# Patient Record
Sex: FEMALE | Race: WHITE | NOT HISPANIC OR LATINO
[De-identification: names, ages, dates, MRNs, and addresses within clinical notes are randomized per-mention and may not be internally consistent; named-entity substitution may affect disease eponyms.]

---

## 2020-03-23 ENCOUNTER — APPOINTMENT (OUTPATIENT)
Dept: MRI IMAGING | Facility: CLINIC | Age: 55
End: 2020-03-23

## 2020-07-21 ENCOUNTER — APPOINTMENT (OUTPATIENT)
Dept: UROGYNECOLOGY | Facility: CLINIC | Age: 55
End: 2020-07-21
Payer: MEDICAID

## 2020-07-21 VITALS
WEIGHT: 145 LBS | DIASTOLIC BLOOD PRESSURE: 80 MMHG | TEMPERATURE: 98.5 F | BODY MASS INDEX: 24.16 KG/M2 | SYSTOLIC BLOOD PRESSURE: 122 MMHG | HEIGHT: 65 IN

## 2020-07-21 DIAGNOSIS — F41.9 ANXIETY DISORDER, UNSPECIFIED: ICD-10-CM

## 2020-07-21 DIAGNOSIS — Z87.898 PERSONAL HISTORY OF OTHER SPECIFIED CONDITIONS: ICD-10-CM

## 2020-07-21 DIAGNOSIS — Z87.828 PERSONAL HISTORY OF OTHER (HEALED) PHYSICAL INJURY AND TRAUMA: ICD-10-CM

## 2020-07-21 DIAGNOSIS — Z63.5 DISRUPTION OF FAMILY BY SEPARATION AND DIVORCE: ICD-10-CM

## 2020-07-21 DIAGNOSIS — Z78.9 OTHER SPECIFIED HEALTH STATUS: ICD-10-CM

## 2020-07-21 DIAGNOSIS — F32.9 MAJOR DEPRESSIVE DISORDER, SINGLE EPISODE, UNSPECIFIED: ICD-10-CM

## 2020-07-21 PROCEDURE — 99204 OFFICE O/P NEW MOD 45 MIN: CPT

## 2020-07-21 RX ORDER — VALACYCLOVIR 500 MG/1
500 TABLET, FILM COATED ORAL
Refills: 0 | Status: ACTIVE | COMMUNITY

## 2020-07-21 RX ORDER — DULOXETINE HYDROCHLORIDE 30 MG/1
CAPSULE, DELAYED RELEASE ORAL
Refills: 0 | Status: ACTIVE | COMMUNITY

## 2020-07-21 RX ORDER — MIRABEGRON 25 MG/1
25 TABLET, FILM COATED, EXTENDED RELEASE ORAL
Refills: 0 | Status: ACTIVE | COMMUNITY

## 2020-07-21 SDOH — SOCIAL STABILITY - SOCIAL INSECURITY: DISRUPTION OF FAMILY BY SEPARATION AND DIVORCE: Z63.5

## 2020-07-21 NOTE — HISTORY OF PRESENT ILLNESS
[Constipation Obstructed Defecation] : no [Urinary Frequency] : no [Urinary Frequency More Than Twice At Night (Nocturia)] : no nocturia [] : years ago [Unable To Restrain Bowel Movement] : no [Urinary Tract Infection] : no [Feelings Of Urinary Urgency] : no [de-identified] : resolved on myrbetriq 25 mg daily [de-identified] : improved since starting Gabapentin at night [de-identified] : resolved on myrbetriq [FreeTextEntry1] : \par Becky presents for consultation for overactive bladder and urgency urinary incontinence which started ~1 year ago. She has been previously evaluated and treated by Dr Andrews. She underwent urodynamic testing in 2019 which was consistent with overactive bladder. She was treated with oxybutynin, which was discontinued due to severe side effects. She also tried Tolterodine which was discontinued due to side effects. She was then started on Myrbetriq 25 mg which she is still on. She reports resolution of her urinary symptoms on the myrbetriq and denies side effects. She is taking Gabapentin, Klonopin, Cymbalta. She reports Gabapentin has improved her nocturia. \par \par PMH: anxiety, depression, mild spondylosis, small disc protrusion T6-T7\par PSH: none\par Social History: , nonsmoker, not employed

## 2020-07-21 NOTE — REASON FOR VISIT
[Questionnaire Received] : Patient questionnaire received [Urinary Incontinence] : urinary incontinence [Intake Form Reviewed] : Patient intake form with past medical history, surgical history, family history and social history reviewed today [Oxybutynin] : Oxybutynin [Other: ___] : [unfilled]

## 2020-07-21 NOTE — PHYSICAL EXAM
[Chaperone Present] : A chaperone was present in the examining room during all aspects of the physical examination [Labia Majora] : were normal [Labia Minora] : were normal [No Bleeding] : there was no active vaginal bleeding [Normal Appearance] : general appearance was normal [Normal] : no abnormalities [Exam Deferred] : was deferred [Post Void Residual ____ml] : post void residual was [unfilled] ml [FreeTextEntry1] : General: Well, appearing. Alert and orientated. No acute distress\par HEENT: Normocephalic, atraumatic and extraocular muscles appear to be intact \par Neck: Full range of motion, no obvious lymphadenopathy, deformities, or masses noted \par Respiratory: Speaking in full sentences comfortably, normal work of breathing and no cough during visit\par Musculoskeletal: active full range of motion in extremities \par Extremities: No upper extremity edema noted\par Skin: no obvious rash or skin lesions\par Neuro: Orientated X 3, speech is fluent, normal rate\par Psych: Normal mood and affect \par   [Tenderness] : ~T no ~M abdominal tenderness observed [Distended] : not distended [H/Smegaly] : no hepatosplenomegaly [de-identified] : no prolapse

## 2020-07-21 NOTE — DISCUSSION/SUMMARY
[FreeTextEntry1] : \linda Pena presents with overactive bladder and urgency incontinence which is significantly improved with Myrbetriq 25 mg daily. She is unable to take anticholinergics secondary to severe side effects. She requires authorization for the myrbetriq which my office will obtain. Rx for Myrbetriq 25 mg provided with refills. She has an appt with new GYN August 1. She will RTO in 1 year for follow up, or sooner if issues arise. All questions answered.

## 2021-02-11 ENCOUNTER — NON-APPOINTMENT (OUTPATIENT)
Age: 56
End: 2021-02-11

## 2021-07-26 ENCOUNTER — APPOINTMENT (OUTPATIENT)
Dept: UROGYNECOLOGY | Facility: CLINIC | Age: 56
End: 2021-07-26
Payer: MEDICAID

## 2021-07-26 VITALS
BODY MASS INDEX: 23.32 KG/M2 | WEIGHT: 140 LBS | DIASTOLIC BLOOD PRESSURE: 80 MMHG | SYSTOLIC BLOOD PRESSURE: 120 MMHG | TEMPERATURE: 96.2 F | HEIGHT: 65 IN

## 2021-07-26 DIAGNOSIS — R82.90 UNSPECIFIED ABNORMAL FINDINGS IN URINE: ICD-10-CM

## 2021-07-26 LAB
BILIRUB UR QL STRIP: NEGATIVE
CLARITY UR: CLEAR
COLLECTION METHOD: NORMAL
GLUCOSE UR-MCNC: NEGATIVE
HCG UR QL: 0.2 EU/DL
HGB UR QL STRIP.AUTO: NEGATIVE
KETONES UR-MCNC: NEGATIVE
LEUKOCYTE ESTERASE UR QL STRIP: NEGATIVE
NITRITE UR QL STRIP: NEGATIVE
PH UR STRIP: 5.5
PROT UR STRIP-MCNC: NORMAL
SP GR UR STRIP: >1.03

## 2021-07-26 PROCEDURE — 99213 OFFICE O/P EST LOW 20 MIN: CPT

## 2021-07-26 PROCEDURE — 81003 URINALYSIS AUTO W/O SCOPE: CPT | Mod: QW

## 2021-07-26 RX ORDER — CLONAZEPAM 0.5 MG/1
0.5 TABLET ORAL
Refills: 0 | Status: COMPLETED | COMMUNITY
End: 2021-07-26

## 2021-07-26 RX ORDER — GABAPENTIN 600 MG/1
600 TABLET, COATED ORAL
Refills: 0 | Status: COMPLETED | COMMUNITY
End: 2021-07-26

## 2021-07-28 LAB
APPEARANCE: ABNORMAL
BACTERIA UR CULT: NORMAL
BACTERIA: NEGATIVE
BILIRUBIN URINE: ABNORMAL
BLOOD URINE: NEGATIVE
COLOR: YELLOW
GLUCOSE QUALITATIVE U: NEGATIVE
HYALINE CASTS: 0 /LPF
KETONES URINE: NORMAL
LEUKOCYTE ESTERASE URINE: NEGATIVE
MICROSCOPIC-UA: NORMAL
NITRITE URINE: NEGATIVE
PH URINE: 6.5
PROTEIN URINE: ABNORMAL
RED BLOOD CELLS URINE: 1 /HPF
SPECIFIC GRAVITY URINE: >=1.03
SQUAMOUS EPITHELIAL CELLS: 1 /HPF
URINE COMMENTS: NORMAL
UROBILINOGEN URINE: NORMAL
WHITE BLOOD CELLS URINE: 1 /HPF

## 2021-08-24 ENCOUNTER — APPOINTMENT (OUTPATIENT)
Dept: UROGYNECOLOGY | Facility: CLINIC | Age: 56
End: 2021-08-24
Payer: MEDICAID

## 2021-08-24 VITALS — SYSTOLIC BLOOD PRESSURE: 140 MMHG | HEART RATE: 70 BPM | DIASTOLIC BLOOD PRESSURE: 80 MMHG | TEMPERATURE: 96.8 F

## 2021-08-24 DIAGNOSIS — R39.15 URGENCY OF URINATION: ICD-10-CM

## 2021-08-24 DIAGNOSIS — R30.0 DYSURIA: ICD-10-CM

## 2021-08-24 LAB
BILIRUB UR QL STRIP: NORMAL
CLARITY UR: NORMAL
COLLECTION METHOD: NORMAL
GLUCOSE UR-MCNC: NORMAL
HCG UR QL: 0.2 EU/DL
HGB UR QL STRIP.AUTO: NORMAL
KETONES UR-MCNC: NORMAL
LEUKOCYTE ESTERASE UR QL STRIP: NORMAL
NITRITE UR QL STRIP: POSITIVE
PH UR STRIP: 5.5
PROT UR STRIP-MCNC: 30
SP GR UR STRIP: 1.03

## 2021-08-24 PROCEDURE — 99213 OFFICE O/P EST LOW 20 MIN: CPT

## 2021-08-24 PROCEDURE — 81003 URINALYSIS AUTO W/O SCOPE: CPT | Mod: QW

## 2021-08-24 RX ORDER — SULFAMETHOXAZOLE AND TRIMETHOPRIM 800; 160 MG/1; MG/1
800-160 TABLET ORAL
Qty: 6 | Refills: 0 | Status: ACTIVE | COMMUNITY
Start: 2021-08-24 | End: 1900-01-01

## 2021-08-24 RX ORDER — PHENAZOPYRIDINE HYDROCHLORIDE 200 MG/1
200 TABLET ORAL EVERY 8 HOURS
Qty: 9 | Refills: 0 | Status: ACTIVE | COMMUNITY
Start: 2021-08-24 | End: 1900-01-01

## 2021-08-27 LAB
APPEARANCE: ABNORMAL
BACTERIA: ABNORMAL
BILIRUBIN URINE: NEGATIVE
BLOOD URINE: ABNORMAL
COLOR: YELLOW
GLUCOSE QUALITATIVE U: NEGATIVE
HYALINE CASTS: 0 /LPF
KETONES URINE: NEGATIVE
LEUKOCYTE ESTERASE URINE: ABNORMAL
MICROSCOPIC-UA: NORMAL
NITRITE URINE: POSITIVE
PH URINE: 6
PROTEIN URINE: ABNORMAL
RED BLOOD CELLS URINE: 18 /HPF
SPECIFIC GRAVITY URINE: 1.03
SQUAMOUS EPITHELIAL CELLS: 0 /HPF
UROBILINOGEN URINE: NORMAL
WHITE BLOOD CELLS URINE: 106 /HPF

## 2021-08-27 RX ORDER — AMOXICILLIN 500 MG/1
500 CAPSULE ORAL
Qty: 21 | Refills: 0 | Status: ACTIVE | COMMUNITY
Start: 2021-05-21

## 2021-08-27 RX ORDER — ESTRADIOL 10 UG/1
10 TABLET VAGINAL
Qty: 8 | Refills: 0 | Status: ACTIVE | COMMUNITY
Start: 2021-08-09

## 2021-08-27 RX ORDER — CARBOXYMETHYLCELLULOSE SODIUM, GLYCERIN, AND POLYSORBATE 80 5; 10; 5 MG/ML; MG/ML; MG/ML
0.5-1-0.5 SOLUTION/ DROPS OPHTHALMIC
Qty: 10 | Refills: 0 | Status: ACTIVE | COMMUNITY
Start: 2021-03-05

## 2021-08-27 RX ORDER — AMOXICILLIN AND CLAVULANATE POTASSIUM 875; 125 MG/1; MG/1
875-125 TABLET, COATED ORAL
Qty: 20 | Refills: 0 | Status: ACTIVE | COMMUNITY
Start: 2021-08-06

## 2021-08-27 RX ORDER — POLYVINYL ALCOHOL, POVIDONE 14; 6 MG/ML; MG/ML
1.4-0.6 SOLUTION/ DROPS OPHTHALMIC
Qty: 30 | Refills: 0 | Status: ACTIVE | COMMUNITY
Start: 2021-03-05

## 2021-08-27 RX ORDER — IBANDRONATE SODIUM 150 MG/1
150 TABLET ORAL
Qty: 1 | Refills: 0 | Status: ACTIVE | COMMUNITY
Start: 2021-08-09

## 2021-08-27 RX ORDER — NITROFURANTOIN (MONOHYDRATE/MACROCRYSTALS) 25; 75 MG/1; MG/1
100 CAPSULE ORAL
Qty: 10 | Refills: 0 | Status: ACTIVE | COMMUNITY
Start: 2021-08-27 | End: 1900-01-01

## 2021-08-27 RX ORDER — LORAZEPAM 1 MG/1
1 TABLET ORAL
Qty: 90 | Refills: 0 | Status: ACTIVE | COMMUNITY
Start: 2021-08-04

## 2021-08-27 RX ORDER — OMEGA-3-ACID ETHYL ESTERS CAPSULES 1 G/1
1 CAPSULE, LIQUID FILLED ORAL
Qty: 120 | Refills: 0 | Status: ACTIVE | COMMUNITY
Start: 2021-02-15

## 2021-08-27 RX ORDER — MECLIZINE HYDROCHLORIDE 25 MG/1
25 TABLET ORAL
Qty: 90 | Refills: 0 | Status: ACTIVE | COMMUNITY
Start: 2021-03-12

## 2021-08-27 RX ORDER — ACETAMINOPHEN AND CODEINE 300; 30 MG/1; MG/1
300-30 TABLET ORAL
Qty: 12 | Refills: 0 | Status: ACTIVE | COMMUNITY
Start: 2021-05-21

## 2021-08-27 RX ORDER — KETOTIFEN FUMARATE 0.25 MG/ML
0.03 SOLUTION/ DROPS OPHTHALMIC
Qty: 5 | Refills: 0 | Status: ACTIVE | COMMUNITY
Start: 2021-03-05

## 2021-08-27 RX ORDER — CYCLOBENZAPRINE HYDROCHLORIDE 10 MG/1
10 TABLET, FILM COATED ORAL
Qty: 30 | Refills: 0 | Status: ACTIVE | COMMUNITY
Start: 2021-07-30

## 2021-08-27 RX ORDER — DULOXETINE HYDROCHLORIDE 60 MG/1
60 CAPSULE, DELAYED RELEASE PELLETS ORAL
Qty: 60 | Refills: 0 | Status: ACTIVE | COMMUNITY
Start: 2021-08-03

## 2021-09-01 NOTE — DISCUSSION/SUMMARY
[FreeTextEntry1] : 1. Dysuria, Frequency, urgency\par - POCT Udip pos nit, small jacques; formal UA/CS sent to lab\par - Due to patient symptoms and Udip will tx empirically with Bactrim DS 1 tab q12h x 3 days. Advised patient to take medication with food and drink plenty of water\par - eRX Pyridium sent to pharmacy advised patient she may take q8h as needed with food\par \par 2. OAB\par - Continue with Myrbetriq as prescribed\par \par 3. Atrophy\par - Continue with vaginal estrogen as prescribed\par \par Will RTO for follow up in 11 months or sooner if needed. Patient agrees to call office with any questions or concerns, verbalized understanding.

## 2021-09-01 NOTE — HISTORY OF PRESENT ILLNESS
[FreeTextEntry1] : Patient with known hx of OAB on myrbetriq presents to office with complaints of dysuria, frequency and urgency since this morning. States she was seen in this office on 7/26/21 and advised to follow up with her PMD due to proteinuria. States when she followed up they had her provide a clean catch urine sample and stated she had a UTI. She was treated with Augmentin x 10 days. Denies fever, chills, back pain or blood in urine. States she also recently completed a course of Monistat for a yeast infection.  States she is taking the myrbetriq and feels it is helping her OAB. Denies any bothersome side effects. She is also using Yuvafem as prescribed without any complaints. Denies pelvic pain, pressure or vaginal bleeding.

## 2021-09-01 NOTE — PHYSICAL EXAM
[No Acute Distress] : in no acute distress [Well developed] : well developed [Well Nourished] : ~L well nourished [Good Hygeine] : demonstrates good hygeine [Oriented x3] : oriented to person, place, and time [Normal Memory] : ~T memory was ~L unimpaired [Normal Mood/Affect] : mood and affect are normal [None] : no CVA tenderness [Warm and Dry] : was warm and dry to touch [Normal Gait] : gait was normal [Normal] : normal external genitalia [Vulvar Atrophy] : vulvar atrophy [Labia Majora] : were normal [Labia Minora] : were normal [Normal Appearance] : general appearance was normal [Dry Mucosa] : dry mucosa [Discharge] : a  ~M vaginal discharge was present [Scant] : scant [Clear] : clear [No Bleeding] : there was no active vaginal bleeding [Anxiety] : patient is not anxious [Tenderness] : ~T no ~M abdominal tenderness observed [Distended] : not distended [FreeTextEntry4] : N

## 2021-09-01 NOTE — PROCEDURE
[Straight Catheterization] : insertion of a straight catheter [Urinary Frequency] : urinary frequency [Other ___] : [unfilled] [Patient] : the patient [___ Fr Straight Tip] : a [unfilled] in [place] straight tip catheter [None] : there were no complications with the catheter insertion [Cloudy] : cloudy [Culture] : culture [Urinalysis] : urinalysis [No Complications] : no complications [Tolerated Well] : the patient tolerated the procedure well [Post procedure instructions and information given] : Post procedure instructions and information were given and reviewed with patient. [0] : 0 [FreeTextEntry1] : Urethra cleared, catheter passed. No CVAT

## 2021-12-01 ENCOUNTER — APPOINTMENT (OUTPATIENT)
Dept: UROGYNECOLOGY | Facility: CLINIC | Age: 56
End: 2021-12-01
Payer: MEDICAID

## 2021-12-01 VITALS — DIASTOLIC BLOOD PRESSURE: 80 MMHG | TEMPERATURE: 96.8 F | SYSTOLIC BLOOD PRESSURE: 140 MMHG

## 2021-12-01 PROCEDURE — 99213 OFFICE O/P EST LOW 20 MIN: CPT

## 2021-12-01 NOTE — DISCUSSION/SUMMARY
[FreeTextEntry1] : \linda Pena presents with overactive bladder and urgency incontinence which is significantly improved with Myrbetriq 25 mg daily. She is unable to take anticholinergics secondary to severe side effects. She is concerned that her copay will increase for Myrbetriq and asked if we can give her an Rx for 50mg which she will cut in half. This will give her a 60 day supply. Rx for Myrbetriq 50 mg provided with refills. If she notices difference in effect of medication with 1/2 50mg tablet, will call to change back to 25mg tablet. She will RTO in 1 year for follow up, or sooner if issues arise. All questions answered.

## 2021-12-01 NOTE — HISTORY OF PRESENT ILLNESS
[Unable To Restrain Bowel Movement] : no [Urinary Frequency] : no [Feelings Of Urinary Urgency] : no [Urinary Frequency More Than Twice At Night (Nocturia)] : no nocturia [Urinary Tract Infection] : no [] : years ago [de-identified] : resolved on myrbetriq 25 mg daily [de-identified] : resolved on myrbetriq [FreeTextEntry1] : \par Becky presents for follow up for overactive bladder and urgency urinary incontinence.  She continues to report significant improvement in her urinary symptoms on the myrbetriq and denies side effects. \par \par  She has been previously evaluated and treated by Dr Andrews. She underwent urodynamic testing in 2019 which was consistent with overactive bladder. She was treated with oxybutynin, which was discontinued due to severe side effects. She also tried Tolterodine which was discontinued due to side effects. She was then started on Myrbetriq 25 mg which she is still on.. \par \par PMH: anxiety, depression, mild spondylosis, small disc protrusion T6-T7\par PSH: none\par Social History: , nonsmoker, not employed

## 2022-02-23 ENCOUNTER — RX CHANGE (OUTPATIENT)
Age: 57
End: 2022-02-23

## 2022-02-25 ENCOUNTER — NON-APPOINTMENT (OUTPATIENT)
Age: 57
End: 2022-02-25

## 2022-05-20 RX ORDER — MIRABEGRON 25 MG/1
25 TABLET, FILM COATED, EXTENDED RELEASE ORAL
Qty: 30 | Refills: 4 | Status: DISCONTINUED | COMMUNITY
Start: 2020-07-21 | End: 2022-05-20

## 2022-12-05 ENCOUNTER — APPOINTMENT (OUTPATIENT)
Dept: UROGYNECOLOGY | Facility: CLINIC | Age: 57
End: 2022-12-05

## 2022-12-05 ENCOUNTER — TRANSCRIPTION ENCOUNTER (OUTPATIENT)
Age: 57
End: 2022-12-05

## 2022-12-05 ENCOUNTER — RESULT CHARGE (OUTPATIENT)
Age: 57
End: 2022-12-05

## 2022-12-05 VITALS — HEART RATE: 66 BPM | TEMPERATURE: 97.1 F | DIASTOLIC BLOOD PRESSURE: 62 MMHG | SYSTOLIC BLOOD PRESSURE: 104 MMHG

## 2022-12-05 DIAGNOSIS — R35.0 FREQUENCY OF MICTURITION: ICD-10-CM

## 2022-12-05 DIAGNOSIS — N89.8 OTHER SPECIFIED NONINFLAMMATORY DISORDERS OF VAGINA: ICD-10-CM

## 2022-12-05 LAB
BILIRUB UR QL STRIP: NORMAL
CLARITY UR: CLEAR
COLLECTION METHOD: NORMAL
GLUCOSE UR-MCNC: NORMAL
HCG UR QL: 0.2 EU/DL
HGB UR QL STRIP.AUTO: NORMAL
KETONES UR-MCNC: NORMAL
LEUKOCYTE ESTERASE UR QL STRIP: NORMAL
NITRITE UR QL STRIP: NORMAL
PH UR STRIP: 7
PROT UR STRIP-MCNC: NORMAL
SP GR UR STRIP: 1.01

## 2022-12-05 PROCEDURE — 81003 URINALYSIS AUTO W/O SCOPE: CPT | Mod: QW

## 2022-12-05 PROCEDURE — 99214 OFFICE O/P EST MOD 30 MIN: CPT

## 2022-12-05 NOTE — DISCUSSION/SUMMARY
[FreeTextEntry1] : OAB:\par -Continue on the Myrbetriq 50mg as she is doing well on medication.\par \par Urinary urgency:\par -Urine dip negative.\par \par Vaginal irritation:\par -Pt was Rx Estrogen cream by her GYN and she will continue on it.\par \par Follow up in 12 months or sooner.  IF pt have any problems/concern to call office.  PT aware and agrees.

## 2022-12-05 NOTE — HISTORY OF PRESENT ILLNESS
[] : years ago [FreeTextEntry1] : Becky, 58y/o presents to the office for follow up on OAB.  She is also c/o urinary urgency and some vaginal irritation.  She is taking Myrbetriq 50mg.  She is also using Estrogen cream Rx by her GYN and noticed some vaginal irritation.  Denies any fever, chills, back pain, or blood in urine.  \par \par \par PMH: anxiety, depression, mild spondylosis, small disc protrusion T6-T7\par PSH: none\par Social History: , nonsmoker, not employed

## 2022-12-05 NOTE — PHYSICAL EXAM
[No Acute Distress] : in no acute distress [Well developed] : well developed [Well Nourished] : ~L well nourished [Good Hygeine] : demonstrates good hygeine [Oriented x3] : oriented to person, place, and time [Normal Memory] : ~T memory was ~L unimpaired [Normal Mood/Affect] : mood and affect are normal [Soft, Nontender] : the abdomen was soft and nontender [Warm and Dry] : was warm and dry to touch [Normal Gait] : gait was normal [Vulvar Atrophy] : vulvar atrophy [Labia Majora] : were normal [Normal] : was normal [Normal Appearance] : general appearance was normal [Discharge] : a  ~M vaginal discharge was present [Scant] : scant [White] : white [No Bleeding] : there was no active vaginal bleeding [de-identified] : No bleeding noted.

## 2023-06-02 ENCOUNTER — APPOINTMENT (OUTPATIENT)
Dept: NEUROLOGY | Facility: CLINIC | Age: 58
End: 2023-06-02

## 2023-08-21 ENCOUNTER — NON-APPOINTMENT (OUTPATIENT)
Age: 58
End: 2023-08-21

## 2023-08-28 ENCOUNTER — RESULT CHARGE (OUTPATIENT)
Age: 58
End: 2023-08-28

## 2023-08-28 ENCOUNTER — APPOINTMENT (OUTPATIENT)
Dept: UROGYNECOLOGY | Facility: CLINIC | Age: 58
End: 2023-08-28
Payer: MEDICAID

## 2023-08-28 DIAGNOSIS — N39.41 URGE INCONTINENCE: ICD-10-CM

## 2023-08-28 DIAGNOSIS — N32.81 OVERACTIVE BLADDER: ICD-10-CM

## 2023-08-28 LAB
BILIRUB UR QL STRIP: NORMAL
CLARITY UR: CLEAR
COLLECTION METHOD: NORMAL
GLUCOSE UR-MCNC: NORMAL
HCG UR QL: 0.2 EU/DL
HGB UR QL STRIP.AUTO: NORMAL
KETONES UR-MCNC: NORMAL
LEUKOCYTE ESTERASE UR QL STRIP: NORMAL
NITRITE UR QL STRIP: NORMAL
PH UR STRIP: 8.5
PROT UR STRIP-MCNC: NORMAL
SP GR UR STRIP: 1.01

## 2023-08-28 PROCEDURE — 99214 OFFICE O/P EST MOD 30 MIN: CPT | Mod: 25

## 2023-08-28 PROCEDURE — 81003 URINALYSIS AUTO W/O SCOPE: CPT | Mod: QW

## 2023-08-28 PROCEDURE — 51701 INSERT BLADDER CATHETER: CPT

## 2023-08-28 RX ORDER — MIRABEGRON 50 MG/1
50 TABLET, FILM COATED, EXTENDED RELEASE ORAL
Qty: 30 | Refills: 5 | Status: DISCONTINUED | COMMUNITY
Start: 2021-12-01 | End: 2023-08-28

## 2023-08-28 NOTE — HISTORY OF PRESENT ILLNESS
[FreeTextEntry1] : Becky, 56y/o presents to the office for follow up as she is anxious and concerned of what bladder issue she has.  She has been on Myrbetriq 25mg PO daily since 2016? by her urologist who told her she neurogenic bladder.  She came to see Dr. Monk and was told she had OAB, otherwise, Myrbetriq 25mg would not have worked.  She had tried Oxybutynin in the past and she had reaction to the medication.  She noticed urgency upon getting up in the morning and had UI.  She have not been using her Estradiol cream as Rx by her GYN.  Stated she is not sexually active. PT was emotional and discussed multiple issues.  She was Dx with Fibromyalgia, CPTSD, anxiety.  She stated she is not a lot of medication.  She is seeing a therapist.  Denies any suicidal ideation or harm to self.  She is more emotional as her mother had moved back with her and was dx with bladder cancer approximately 2 months ago.  Mother finishing up with chemotherapy and radiation.  PT unsure if she has bladder cancer and was concerned.  She had also followed up with her PMD.  Labs and EKG were abnormal but pt did not have the actual results except message from her PMD portal stating this.

## 2023-08-28 NOTE — DISCUSSION/SUMMARY
[FreeTextEntry1] : OAB/UI: -Continue on the Myrbetriq 25mg PO daily.  PT stated it helps. -Discussion on what the medication does and what it's for.  She is aware. -Continue on behavioral modification and diet, fluid intake. -PVR in office 80mL, urine dip negative. -Discussed in depth - bladder cancer, OAB, neurogenic bladder.    Vaginal atrophy: -PT to continue use of the Estradiol cream as Rx by her GYN.  -R/B were reviewed with pt.  PT was advised to follow up with therapist and she is aware she will be following up.   Pt will follow up in 6-12 months or sooner if needed.  If pt have any problems/concern to call office.  PT verbalizes understanding and agrees.

## 2023-08-28 NOTE — PROCEDURE
[Straight Catheterization] : insertion of a straight catheter [Urgent Incontinence] : urgent incontinence [Other ___] : [unfilled] [Patient] : the patient [___ Fr Straight Tip] : a [unfilled] in [place] straight tip catheter [Clear] : clear [Tolerated Well] : the patient tolerated the procedure well [No Complications] : no complications [Post procedure instructions and information given] : Post procedure instructions and information were given and reviewed with patient. [FreeTextEntry1] : Urine obtained via 14FR sterile straight, PVR 80mL, no CVAT. [de-identified] : PVR 80mL

## 2023-08-28 NOTE — PHYSICAL EXAM
[Well developed] : well developed [Well Nourished] : ~L well nourished [Good Hygeine] : demonstrates good hygeine [Oriented x3] : oriented to person, place, and time [Anxiety] : patient is anxious [Soft, Nontender] : the abdomen was soft and nontender [Warm and Dry] : was warm and dry to touch [Normal Gait] : gait was normal [Labia Majora] : were normal [Normal] : was normal

## 2024-01-18 ENCOUNTER — RX RENEWAL (OUTPATIENT)
Age: 59
End: 2024-01-18

## 2024-01-18 RX ORDER — MIRABEGRON 50 MG/1
50 TABLET, FILM COATED, EXTENDED RELEASE ORAL
Qty: 30 | Refills: 2 | Status: ACTIVE | COMMUNITY
Start: 2023-08-28 | End: 1900-01-01